# Patient Record
Sex: FEMALE | Race: WHITE | NOT HISPANIC OR LATINO | Employment: UNEMPLOYED | URBAN - METROPOLITAN AREA
[De-identification: names, ages, dates, MRNs, and addresses within clinical notes are randomized per-mention and may not be internally consistent; named-entity substitution may affect disease eponyms.]

---

## 2017-01-30 ENCOUNTER — APPOINTMENT (OUTPATIENT)
Dept: WOMENS IMAGING | Facility: HOSPITAL | Age: 37
End: 2017-01-30

## 2017-01-31 ENCOUNTER — HOSPITAL ENCOUNTER (OUTPATIENT)
Dept: PREOP | Facility: HOSPITAL | Age: 37
Setting detail: HOSPITAL OUTPATIENT SURGERY
Discharge: HOME | End: 2017-01-31
Admitting: INTERNAL MEDICINE

## 2017-01-31 VITALS
SYSTOLIC BLOOD PRESSURE: 159 MMHG | OXYGEN SATURATION: 99 % | WEIGHT: 229 LBS | BODY MASS INDEX: 40.57 KG/M2 | DIASTOLIC BLOOD PRESSURE: 103 MMHG | RESPIRATION RATE: 20 BRPM | HEIGHT: 63 IN | HEART RATE: 98 BPM | TEMPERATURE: 98.9 F

## 2017-01-31 PROCEDURE — 91110 GI TRC IMG INTRAL ESOPH-ILE: CPT

## 2017-02-21 ENCOUNTER — TELEPHONE (OUTPATIENT)
Dept: GASTROENTEROLOGY | Facility: CLINIC | Age: 37
End: 2017-02-21

## 2017-02-21 NOTE — TELEPHONE ENCOUNTER
Patient had pillcam 1/31 she has a follow up with us this Thursday 2/23 we will need these results

## 2017-02-23 ENCOUNTER — OFFICE VISIT (OUTPATIENT)
Dept: GASTROENTEROLOGY | Facility: CLINIC | Age: 37
End: 2017-02-23

## 2017-02-23 VITALS
WEIGHT: 233 LBS | BODY MASS INDEX: 41.29 KG/M2 | HEIGHT: 63 IN | SYSTOLIC BLOOD PRESSURE: 145 MMHG | DIASTOLIC BLOOD PRESSURE: 109 MMHG | HEART RATE: 99 BPM | OXYGEN SATURATION: 96 %

## 2017-02-23 DIAGNOSIS — D50.9 IRON DEFICIENCY ANEMIA, UNSPECIFIED IRON DEFICIENCY ANEMIA TYPE: ICD-10-CM

## 2017-02-23 DIAGNOSIS — K58.0 IRRITABLE BOWEL SYNDROME WITH DIARRHEA: Primary | ICD-10-CM

## 2017-02-23 DIAGNOSIS — R10.31 RLQ ABDOMINAL PAIN: ICD-10-CM

## 2017-02-23 PROCEDURE — 99213 OFFICE O/P EST LOW 20 MIN: CPT | Performed by: PHYSICIAN ASSISTANT

## 2017-02-23 RX ORDER — METRONIDAZOLE 500 MG/1
500 TABLET ORAL 3 TIMES DAILY
Qty: 30 TABLET | Refills: 0 | Status: SHIPPED | OUTPATIENT
Start: 2017-02-23

## 2017-03-04 ENCOUNTER — TELEPHONE (OUTPATIENT)
Dept: GASTROENTEROLOGY | Facility: CLINIC | Age: 37
End: 2017-03-04

## 2017-03-05 NOTE — TELEPHONE ENCOUNTER
Please call patient to see if she has relief from diarrhea after 10 day therapy with Flagyl. If not, please check to see if Xifaxan was approved by her insurance. Thanks.

## 2017-03-08 NOTE — TELEPHONE ENCOUNTER
xifaxan was approved and pt started taking last week. She is doing ok so far but she will call us back if she has any problems.sk

## 2017-03-30 ENCOUNTER — OFFICE VISIT (OUTPATIENT)
Dept: GASTROENTEROLOGY | Facility: CLINIC | Age: 37
End: 2017-03-30

## 2017-03-30 VITALS
HEIGHT: 63 IN | SYSTOLIC BLOOD PRESSURE: 124 MMHG | BODY MASS INDEX: 43.23 KG/M2 | WEIGHT: 244 LBS | HEART RATE: 100 BPM | DIASTOLIC BLOOD PRESSURE: 82 MMHG | OXYGEN SATURATION: 96 %

## 2017-03-30 DIAGNOSIS — R14.0 BLOATING: ICD-10-CM

## 2017-03-30 DIAGNOSIS — R63.5 WEIGHT GAIN: ICD-10-CM

## 2017-03-30 DIAGNOSIS — K58.0 IRRITABLE BOWEL SYNDROME WITH DIARRHEA: Primary | ICD-10-CM

## 2017-03-30 PROCEDURE — 99213 OFFICE O/P EST LOW 20 MIN: CPT | Performed by: PHYSICIAN ASSISTANT

## 2017-03-30 NOTE — PROGRESS NOTES
: 1980    Chief Complaint   Patient presents with   • Follow-up     diarrhea       Jessica Narayanan is a 36 y.o. female who presents to the office today for Follow-up (diarrhea)      History of Present Illness:  She has been feeling great since starting Xifaxan for treatment of IBS-D. Diarrhea has resolved. She has 1-2 soft, formed bowel movements per day. She is hoping that this improvement continues even after treatment is complete. Her anxiety has decreased since starting this medication because she is no longer having to worry about where the nearest restroom is located. She is concerned about her recent weight gain of 11 lbs in the past 1 month but denies any new complaints other than the weight gain. Abdominal pain has improved and has only occurred 2 times in the past 2 weeks and as very mild. Previously, she would have 6 diarrhea stools per day.     Review of Systems   Constitutional: Positive for unexpected weight change. Negative for appetite change, chills, fatigue and fever.   HENT: Negative for hearing loss, mouth sores and nosebleeds.    Eyes: Negative for itching and visual disturbance.   Respiratory: Negative for cough, chest tightness, shortness of breath and wheezing.    Cardiovascular: Negative for chest pain, palpitations and leg swelling.   Endocrine: Negative for cold intolerance, heat intolerance, polydipsia and polyuria.   Genitourinary: Negative for dysuria, frequency and hematuria.   Musculoskeletal: Negative for arthralgias, joint swelling and myalgias.   Skin: Negative for rash and wound.   Allergic/Immunologic: Negative for food allergies and immunocompromised state.   Neurological: Negative for seizures, syncope, weakness and light-headedness.   Hematological: Negative for adenopathy. Does not bruise/bleed easily.   Psychiatric/Behavioral: Negative for confusion and sleep disturbance. The patient is not nervous/anxious.    Gastrointestinal: Positive for abdominal  distension and gas/bloating.    Past Medical History:   Diagnosis Date   • Anemia    • Hypertension    • Irritable bowel syndrome        Past Surgical History:   Procedure Laterality Date   • COLONOSCOPY N/A 12/21/2016    COLONOSCOPY  Surgeon: Renuka Somers DO;  Colon, Random Biopsies; Benign colonic mucosa with no diagnostic abnormality    • ENDOSCOPY N/A 12/21/2016    EGD by  Renuka Somers DO;  Gastric Antrum biopsies; Mild chronic gastritis with reactive epithelial changes, Distal esophagus, biopsies; Reactive squamous mucosa with changes compatible with reflux esophagitis, chronic carditis, negitive for intestinal metaplasia   • LAPAROSCOPIC GASTRIC BANDING     • UPPER GASTROINTESTINAL ENDOSCOPY      Joe[facility] 01/2015       Family History   Problem Relation Age of Onset   • Breast cancer Mother    • No Known Problems Father    • No Known Problems Brother    • Colon cancer Maternal Grandmother    • Alzheimer's disease Maternal Grandfather    • No Known Problems Paternal Grandmother    • No Known Problems Paternal Grandfather        Social History     Social History   • Marital status:      Spouse name: N/A   • Number of children: N/A   • Years of education: N/A     Social History Main Topics   • Smoking status: Never Smoker   • Smokeless tobacco: Never Used   • Alcohol use Yes      Comment: socially   • Drug use: No   • Sexual activity: Defer     Other Topics Concern   • None     Social History Narrative         Current Outpatient Prescriptions:   •  Fe-Succ Ac-C-Thre Ac-B12-FA (FERREX 150 FORTE PLUS)  MG capsule capsule, Take  by mouth Daily With Breakfast., Disp: , Rfl:   •  lisinopril (PRINIVIL,ZESTRIL) 10 MG tablet, Take 10 mg by mouth Daily., Disp: , Rfl:   •  Multiple Vitamins-Minerals (MULTI COMPLETE PO), Take  by mouth., Disp: , Rfl:   •  rifaximin (XIFAXAN) 550 MG tablet, Take 1 tablet by mouth 3 (Three) Times a Day., Disp: 42 tablet, Rfl: 0  •  vitamin D (ERGOCALCIFEROL) 64378 UNITS  "capsule capsule, Take 50,000 Units by mouth 1 (One) Time Per Week., Disp: , Rfl:     Allergies:   Contrast dye    Vitals:  /82  Pulse 100  Ht 63\" (160 cm)  Wt 244 lb (111 kg)  SpO2 96%  Breastfeeding? No  BMI 43.22 kg/m2    Physical Exam   Constitutional: She is oriented to person, place, and time. She appears well-developed and well-nourished. No distress.   HENT:   Head: Normocephalic and atraumatic.   Right Ear: External ear normal.   Left Ear: External ear normal.   Nose: Nose normal.   Mouth/Throat: Oropharynx is clear and moist.   Eyes: Conjunctivae and EOM are normal. Right eye exhibits no discharge. Left eye exhibits no discharge. No scleral icterus.   Neck: Normal range of motion. Neck supple.   Cardiovascular: Normal rate, regular rhythm and normal heart sounds.  Exam reveals no gallop and no friction rub.    No murmur heard.  Pulmonary/Chest: Effort normal and breath sounds normal. No respiratory distress. She has no wheezes. She has no rales. She exhibits no tenderness.   Abdominal: Soft. Normal appearance and bowel sounds are normal. She exhibits no distension, no ascites and no mass. There is no tenderness. There is no rigidity and no guarding. No hernia.   Musculoskeletal: Normal range of motion. She exhibits no edema or deformity.   Neurological: She is alert and oriented to person, place, and time. She exhibits normal muscle tone. Coordination normal.   Skin: Skin is warm and dry. No rash noted. No erythema. No pallor.   Psychiatric: She has a normal mood and affect. Her behavior is normal. Judgment and thought content normal.   Nursing note and vitals reviewed.       Assessment/Plan:  Diagnoses and all orders for this visit:    Irritable bowel syndrome with diarrhea  Weight gain  Bloating  Diarrhea, abdominal pain and abdominal tenderness has improved significantly. She is very happy with her improvement with Xifaxan 550 mg BID. She has 2 days left of therapy. She will call if diarrhea " returns. She was instructed to maintain adequate fiber in her diet (25-45 g daily) and to increase fiber gradually. Weight gain should be monitored. Bloating has improved as well and is described as mild.       · I discussed the patients findings and my recommendations with the patient. All of their questions were answered to their satisfaction and they understand the plan.  · She will call with any interval concerns.    Return if symptoms worsen or fail to improve.                  Electronically signed by: Gabriella Maldonado PA-C 3/30/2017 at 6:03 PM.        I have reviewed the documentation, treatment plan and medical decision making by Gabriella Maldonado PA-C.       Attestation signed by: Renuka Somers D.O. 3/30/2017 at 6:03 PM.

## 2017-04-06 ENCOUNTER — TELEPHONE (OUTPATIENT)
Dept: GASTROENTEROLOGY | Facility: CLINIC | Age: 37
End: 2017-04-06

## 2017-04-06 RX ORDER — SUCRALFATE 1 G/1
1 TABLET ORAL
Qty: 120 TABLET | Refills: 0 | Status: SHIPPED | OUTPATIENT
Start: 2017-04-06

## 2017-04-06 NOTE — TELEPHONE ENCOUNTER
Spoke with patient and let her know Gabriella Maldonado's recommendations. She voiced understanding. She was instructed to call back with any other questions or problems.

## 2017-04-06 NOTE — TELEPHONE ENCOUNTER
Patient called stating she was having acid reflux (burns when she swallows). Her PCP has her taking Prilosec and Mylanta, but she states this is not relieving her symptoms. She wanted to know your thoughts on this. Thanks- Lor    It may take up to 4 weeks for her to see affect of Prilosec but she should continue it. I will send Carafate to her pharmacy. She should take it 4 times daily (before meals and before bed). Discontinue Mylanta when starting Carafate. If the pill is too hard to swallow, she can dissolve in small amount of liquid and drink.

## 2018-03-16 ENCOUNTER — APPOINTMENT (OUTPATIENT)
Dept: WOMENS IMAGING | Facility: HOSPITAL | Age: 38
End: 2018-03-16

## 2018-03-16 PROCEDURE — 77067 SCR MAMMO BI INCL CAD: CPT | Performed by: RADIOLOGY

## 2018-03-16 PROCEDURE — 77063 BREAST TOMOSYNTHESIS BI: CPT | Performed by: RADIOLOGY

## 2019-05-30 ENCOUNTER — APPOINTMENT (OUTPATIENT)
Dept: WOMENS IMAGING | Facility: HOSPITAL | Age: 39
End: 2019-05-30

## 2019-05-30 PROCEDURE — 77062 BREAST TOMOSYNTHESIS BI: CPT | Performed by: RADIOLOGY

## 2019-05-30 PROCEDURE — 77066 DX MAMMO INCL CAD BI: CPT | Performed by: RADIOLOGY

## 2019-05-30 PROCEDURE — 76641 ULTRASOUND BREAST COMPLETE: CPT | Performed by: RADIOLOGY

## 2020-09-28 ENCOUNTER — APPOINTMENT (OUTPATIENT)
Dept: WOMENS IMAGING | Facility: HOSPITAL | Age: 40
End: 2020-09-28

## 2020-09-28 PROCEDURE — 77067 SCR MAMMO BI INCL CAD: CPT | Performed by: RADIOLOGY

## 2020-09-28 PROCEDURE — 77063 BREAST TOMOSYNTHESIS BI: CPT | Performed by: RADIOLOGY
